# Patient Record
Sex: MALE | Race: ASIAN | Employment: UNEMPLOYED | URBAN - METROPOLITAN AREA
[De-identification: names, ages, dates, MRNs, and addresses within clinical notes are randomized per-mention and may not be internally consistent; named-entity substitution may affect disease eponyms.]

---

## 2019-05-27 ENCOUNTER — APPOINTMENT (OUTPATIENT)
Dept: CT IMAGING | Age: 39
End: 2019-05-27
Attending: EMERGENCY MEDICINE
Payer: SELF-PAY

## 2019-05-27 ENCOUNTER — HOSPITAL ENCOUNTER (EMERGENCY)
Age: 39
Discharge: HOME | End: 2019-05-27
Attending: EMERGENCY MEDICINE
Payer: SELF-PAY

## 2019-05-27 VITALS
SYSTOLIC BLOOD PRESSURE: 117 MMHG | RESPIRATION RATE: 14 BRPM | WEIGHT: 136 LBS | OXYGEN SATURATION: 100 % | HEART RATE: 60 BPM | TEMPERATURE: 98.9 F | BODY MASS INDEX: 23.22 KG/M2 | DIASTOLIC BLOOD PRESSURE: 78 MMHG | HEIGHT: 64 IN

## 2019-05-27 DIAGNOSIS — R51.9 NONINTRACTABLE HEADACHE, UNSPECIFIED CHRONICITY PATTERN, UNSPECIFIED HEADACHE TYPE: Primary | ICD-10-CM

## 2019-05-27 LAB
ALBUMIN SERPL-MCNC: 3.9 G/DL (ref 3.4–5)
ALBUMIN/GLOB SERPL: 1.1 {RATIO} (ref 0.8–1.7)
ALP SERPL-CCNC: 67 U/L (ref 45–117)
ALT SERPL-CCNC: 28 U/L (ref 16–61)
ANION GAP SERPL CALC-SCNC: 7 MMOL/L (ref 3–18)
AST SERPL-CCNC: 22 U/L (ref 15–37)
BASOPHILS # BLD: 0 K/UL (ref 0–0.1)
BASOPHILS NFR BLD: 0 % (ref 0–2)
BILIRUB SERPL-MCNC: 0.3 MG/DL (ref 0.2–1)
BUN SERPL-MCNC: 11 MG/DL (ref 7–18)
BUN/CREAT SERPL: 12 (ref 12–20)
CALCIUM SERPL-MCNC: 9.1 MG/DL (ref 8.5–10.1)
CHLORIDE SERPL-SCNC: 108 MMOL/L (ref 100–108)
CO2 SERPL-SCNC: 28 MMOL/L (ref 21–32)
CREAT SERPL-MCNC: 0.95 MG/DL (ref 0.6–1.3)
DIFFERENTIAL METHOD BLD: ABNORMAL
EOSINOPHIL # BLD: 0.1 K/UL (ref 0–0.4)
EOSINOPHIL NFR BLD: 1 % (ref 0–5)
ERYTHROCYTE [DISTWIDTH] IN BLOOD BY AUTOMATED COUNT: 13.8 % (ref 11.6–14.5)
ERYTHROCYTE [SEDIMENTATION RATE] IN BLOOD: 5 MM/HR (ref 0–15)
GLOBULIN SER CALC-MCNC: 3.6 G/DL (ref 2–4)
GLUCOSE SERPL-MCNC: 100 MG/DL (ref 74–99)
HCT VFR BLD AUTO: 40.4 % (ref 36–48)
HGB BLD-MCNC: 13.6 G/DL (ref 13–16)
LYMPHOCYTES # BLD: 2.6 K/UL (ref 0.9–3.6)
LYMPHOCYTES NFR BLD: 35 % (ref 21–52)
MCH RBC QN AUTO: 29.5 PG (ref 24–34)
MCHC RBC AUTO-ENTMCNC: 33.7 G/DL (ref 31–37)
MCV RBC AUTO: 87.6 FL (ref 74–97)
MONOCYTES # BLD: 0.7 K/UL (ref 0.05–1.2)
MONOCYTES NFR BLD: 9 % (ref 3–10)
NEUTS SEG # BLD: 4 K/UL (ref 1.8–8)
NEUTS SEG NFR BLD: 55 % (ref 40–73)
PLATELET # BLD AUTO: 227 K/UL (ref 135–420)
PMV BLD AUTO: 9.3 FL (ref 9.2–11.8)
POTASSIUM SERPL-SCNC: 3.8 MMOL/L (ref 3.5–5.5)
PROT SERPL-MCNC: 7.5 G/DL (ref 6.4–8.2)
RBC # BLD AUTO: 4.61 M/UL (ref 4.7–5.5)
SODIUM SERPL-SCNC: 143 MMOL/L (ref 136–145)
WBC # BLD AUTO: 7.4 K/UL (ref 4.6–13.2)

## 2019-05-27 PROCEDURE — 96361 HYDRATE IV INFUSION ADD-ON: CPT

## 2019-05-27 PROCEDURE — 74011250636 HC RX REV CODE- 250/636: Performed by: EMERGENCY MEDICINE

## 2019-05-27 PROCEDURE — 74011250636 HC RX REV CODE- 250/636: Performed by: PHYSICIAN ASSISTANT

## 2019-05-27 PROCEDURE — 96375 TX/PRO/DX INJ NEW DRUG ADDON: CPT

## 2019-05-27 PROCEDURE — 85025 COMPLETE CBC W/AUTO DIFF WBC: CPT

## 2019-05-27 PROCEDURE — 70450 CT HEAD/BRAIN W/O DYE: CPT

## 2019-05-27 PROCEDURE — 85652 RBC SED RATE AUTOMATED: CPT

## 2019-05-27 PROCEDURE — 96374 THER/PROPH/DIAG INJ IV PUSH: CPT

## 2019-05-27 PROCEDURE — 99283 EMERGENCY DEPT VISIT LOW MDM: CPT

## 2019-05-27 PROCEDURE — 80053 COMPREHEN METABOLIC PANEL: CPT

## 2019-05-27 RX ORDER — DIPHENHYDRAMINE HYDROCHLORIDE 50 MG/ML
25 INJECTION, SOLUTION INTRAMUSCULAR; INTRAVENOUS ONCE
Status: COMPLETED | OUTPATIENT
Start: 2019-05-27 | End: 2019-05-27

## 2019-05-27 RX ORDER — NAPROXEN 500 MG/1
500 TABLET ORAL 2 TIMES DAILY WITH MEALS
Qty: 20 TAB | Refills: 0 | Status: SHIPPED | OUTPATIENT
Start: 2019-05-27

## 2019-05-27 RX ORDER — PROCHLORPERAZINE EDISYLATE 5 MG/ML
10 INJECTION INTRAMUSCULAR; INTRAVENOUS
Status: DISCONTINUED | OUTPATIENT
Start: 2019-05-27 | End: 2019-05-27 | Stop reason: HOSPADM

## 2019-05-27 RX ORDER — KETOROLAC TROMETHAMINE 30 MG/ML
30 INJECTION, SOLUTION INTRAMUSCULAR; INTRAVENOUS
Status: COMPLETED | OUTPATIENT
Start: 2019-05-27 | End: 2019-05-27

## 2019-05-27 RX ADMIN — DIPHENHYDRAMINE HYDROCHLORIDE 25 MG: 50 INJECTION INTRAMUSCULAR; INTRAVENOUS at 18:47

## 2019-05-27 RX ADMIN — KETOROLAC TROMETHAMINE 30 MG: 30 INJECTION, SOLUTION INTRAMUSCULAR; INTRAVENOUS at 18:48

## 2019-05-27 RX ADMIN — SODIUM CHLORIDE 1000 ML: 900 INJECTION, SOLUTION INTRAVENOUS at 18:47

## 2019-05-27 NOTE — ED TRIAGE NOTES
Left sided headache intermittently x 2 weeks, over last 2 days pain has been more constant and worsening

## 2019-05-27 NOTE — DISCHARGE INSTRUCTIONS
Patient Education        Dolor de Aleja Enter: Instrucciones de cuidado - [ Headache: Care Instructions ]  Instrucciones de cuidado    Los chris de lamberto tienen muchas causas posibles. La mayoría de los chris de lamberto no son señal de un problema más kishan y mejoran por sí solos. El tratamiento en el hogar podría ayudarlo a sentirse mejor con Vimal Miranda. El médico lo ha revisado minuciosamente, jose puede desarrollar problemas más tarde. Si nota algún problema o síntomas, busque tratamiento médico inmediatamente. La atención de seguimiento es florence parte clave de shukla tratamiento y seguridad. Asegúrese de hacer y acudir a todas las citas, y llame a shukla médico si está teniendo problemas. También es florence buena idea saber los resultados de jaime exámenes y mantener florence lista de los medicamentos que uche. ¿Cómo puede cuidarse en el [facility]? · No conduzca si ha tomado analgésicos (medicamentos para el dolor) recetados. · Descanse en un cuarto tranquilo y oscuro hasta que desaparezca el dolor de Aleja Enter. Cierre los ojos y trate de relajarse o dormirse. No vandana la televisión ni sachi. · Colóquese un paño frío y húmedo o [facility] compresa fría sobre la iveth adolorida de 10 a 21 minutos cada vez. Póngase un paño resendez entre la compresa fría y la piel. · Utilice florence toalla húmeda tibia o florence almohadilla térmica ajustada a baja temperatura para relajar los músculos tensos del frederic y los hombros.  · [place]ídale a alguien que le antonette masajes suaves en el frederic y los hombros.  · [place] los analgésicos exactamente lauren le fueron indicados. ? Si el médico le recetó un analgésico, tómelo según las indicaciones. ? Si no está tomando un analgésico recetado, pregúntele a shukla médico si puede keyla claude de [address] · Tenga cuidado de no keyla analgésicos con mayor frecuencia que la permitida en las indicaciones porque los chris de lamberto podrían empeorar o aparecer con mayor frecuencia florence vez que el medicamento pierda shukla [place].   · Preste atención a los [employer] que aparecen con el dolor de [place], [place], debilidad o entumecimiento, cambios en la visión o confusión. Podrían ser señales de un problema más grave. Para prevenir los chris de lamberto  · [employer] un diario de jaime chris de lamberto para que pueda averiguar qué los desencadena. Evitar los desencadenantes podría ayudar a prevenir los chris de [place]. Anote cuándo empieza cada dolor de [place], cuánto dura y cómo es el dolor (palpitante, jacquie, punzante o sordo). Anote cualquier otro síntoma que haya tenido con el dolor de [place], [place] náuseas, destellos de renetta o EMERSON, o sensibilidad a la renetta brillante o a los ruidos erin. Anote si el dolor de lamberto ocurrió cerca de shukla menstruación. Enumere todos los factores que pudieran beata desencadenado el dolor de [place], lauren ciertos alimentos (chocolate, queso, vino) u olores, humo, luces brillantes, estrés o falta de sueño. · Encuentre maneras saludables de [facility]. Los chris de [place] son más comunes netta o kate después de un momento estresante. Tómese un tiempo para relajarse antes y después de hacer algo que le haya causado un dolor de lamberto en el pasado. · Trate de mantener jaime músculos relajados mediante florence buena postura. Revise si tiene [employer] de la [place], la cortney, el frederic y los hombros y trate de relajarlos. Cuando se siente en un escritorio, cambie de posición con frecuencia y estírese por 27 segundos cada hora. · Noemi suficiente ejercicio y duerma bastante. · Coma en forma regular y jaz. Largos períodos sin comer pueden provocar un dolor de lamberto. · Regálese un masaje. Algunas personas encuentran que los masajes hechos con regularidad son [place] para aliviar la tensión. · Limite la cafeína. No mikki demasiado café, té ni sodas. Nicho no deje de consumir cafeína de repente, porque eso también puede provocarle chris de [place].   · Reduzca la tensión en los ojos a causa de la computadora parpadeando con frecuencia y apartando la mirada de la pantalla a menudo. Asegúrese de tener lentes adecuados y de que shukla monitor esté colocado de manera correcta, lauren a un brazo de distancia. · Busque ayuda si tiene depresión o ansiedad. Olamide chris de [place] podrían relacionarse con estas afecciones. El tratamiento puede prevenir los chris de [place] y ayudar con los síntomas de ansiedad o depresión. ¿Cuándo debe pedir ayuda? Llame al 911 en cualquier momento que piense que puede necesitar atención de emergencia. Por ejemplo, llame si:    · Tiene señales de un ataque cerebral. Estas pueden incluir:  ? Parálisis, entumecimiento o debilidad repentinos en la cortney, el brazo o la pierna, sobre todo si ocurre en un solo lado del cuerpo. ? Cambios repentinos en la visión. ? Dificultades repentinas para hablar. ? Confusión repentina o dificultad para comprender frases sencillas. ? Problemas repentinos para caminar o mantener el equilibrio. ? Dolor de [place] intenso y repentino, distinto de los chris de lamberto anteriores.    Llame a shukla médico ahora mismo o busque atención médica inmediata si:    · Tiene un dolor de [place] o peor.     · Shukla dolor de lamberto empeora mucho. ¿Dónde puede encontrar más información en inglés? William Safer a http://angela-glen.info/. Escriba K765 en la búsqueda para aprender más acerca de \"Dolor de lamberto: Instrucciones de cuidado - [ Headache: Care Instructions ]. \"  Revisado: 3 junio, 2018  Versión del contenido: 11.9  © 2306-1033 [employer]. Las instrucciones de cuidado fueron adaptadas bajo licencia por [employer] (which disclaims liability or warranty for this information). Si usted tiene [place] afección médica o sobre estas instrucciones, siempre pregunte a shukla profesional de brooks. [employer] niega toda garantía o responsabilidad por shukla uso de esta información.

## 2019-05-28 NOTE — ED NOTES
I have reviewed discharge instructions with the patient and spouse. The patient and spouse verbalized understanding. Patient armband removed and shredded  IV catheter discontinued intact. Site without signs and symptoms of complications. Dressing and pressure applied.

## 2019-06-07 NOTE — ED PROVIDER NOTES
EMERGENCY DEPARTMENT HISTORY AND PHYSICAL EXAM    7:08 AM      Date: 5/27/2019  Patient Name: Karmen Herman    History of Presenting Illness     Chief Complaint   Patient presents with    Headache         History Provided By: Patient    Additional History (Context): Karmen Herman is a 41 y.o. male with no past medical history who presents with left-sided headache for 2 weeks. Patient is a Croatian speaker. Patient denies any trauma to the head. Patient denies any fever. Does have a history of anxiety. He denies smoking alcohol or recreational drug use. This is not his worst headache. Sadiq Kwok PCP: None    Current Outpatient Medications   Medication Sig Dispense Refill    naproxen (NAPROSYN) 500 mg tablet Take 1 Tab by mouth two (2) times daily (with meals). 20 Tab 0       Past History     Past Medical History:  History reviewed. No pertinent past medical history. Past Surgical History:  History reviewed. No pertinent surgical history. Family History:  History reviewed. No pertinent family history. Social History:  Social History     Tobacco Use    Smoking status: Never Smoker    Smokeless tobacco: Never Used   Substance Use Topics    Alcohol use: Never     Frequency: Never    Drug use: Not on file       Allergies:  No Known Allergies      Review of Systems       Review of Systems   Constitutional: Negative. Negative for chills, diaphoresis and fever. HENT: Negative. Negative for congestion, rhinorrhea and sore throat. Eyes: Negative. Negative for pain, discharge and redness. Respiratory: Negative. Negative for cough, chest tightness, shortness of breath and wheezing. Cardiovascular: Negative. Negative for chest pain. Gastrointestinal: Negative. Negative for abdominal pain, constipation, diarrhea, nausea and vomiting. Genitourinary: Negative. Negative for dysuria, flank pain, frequency, hematuria and urgency. Musculoskeletal: Negative.   Negative for back pain and neck pain. Skin: Negative. Negative for rash. Neurological: Positive for headaches. Negative for syncope, weakness and numbness. Psychiatric/Behavioral: Negative. All other systems reviewed and are negative. Physical Exam     Visit Vitals  /78   Pulse 60   Temp 98.9 °F (37.2 °C)   Resp 14   Ht 5' 4\" (1.626 m)   Wt 61.7 kg (136 lb)   SpO2 100%   BMI 23.34 kg/m²         Physical Exam   Constitutional: He appears well-developed and well-nourished. Non-toxic appearance. He does not have a sickly appearance. He does not appear ill. No distress. HENT:   Head: Normocephalic and atraumatic. Mouth/Throat: Oropharynx is clear and moist. No oropharyngeal exudate. Eyes: Pupils are equal, round, and reactive to light. Conjunctivae and EOM are normal. No scleral icterus. Neck: Normal range of motion. Neck supple. No hepatojugular reflux and no JVD present. No tracheal deviation present. No thyromegaly present. Cardiovascular: Normal rate, regular rhythm, S1 normal, S2 normal, normal heart sounds, intact distal pulses and normal pulses. Exam reveals no gallop, no S3 and no S4. No murmur heard. Pulses:       Radial pulses are 2+ on the right side, and 2+ on the left side. Dorsalis pedis pulses are 2+ on the right side, and 2+ on the left side. Pulmonary/Chest: Effort normal and breath sounds normal. No respiratory distress. He has no decreased breath sounds. He has no wheezes. He has no rhonchi. He has no rales. Abdominal: Soft. Normal appearance and bowel sounds are normal. He exhibits no distension and no mass. There is no hepatosplenomegaly. There is no tenderness. There is no rigidity, no rebound, no guarding, no CVA tenderness, no tenderness at McBurney's point and negative Doshi's sign. Musculoskeletal: Normal range of motion. Lymphadenopathy:        Head (right side): No submental, no submandibular, no preauricular and no occipital adenopathy present.         Head (left side): No submental, no submandibular, no preauricular and no occipital adenopathy present. He has no cervical adenopathy. Right: No supraclavicular adenopathy present. Left: No supraclavicular adenopathy present. Neurological: He is alert. He has normal strength and normal reflexes. He is not disoriented. No cranial nerve deficit or sensory deficit. Coordination and gait normal. GCS eye subscore is 4. GCS verbal subscore is 5. GCS motor subscore is 6. Skin: Skin is warm, dry and intact. No rash noted. He is not diaphoretic. Psychiatric: He has a normal mood and affect. His speech is normal and behavior is normal. Judgment and thought content normal. Cognition and memory are normal.   Nursing note and vitals reviewed. Diagnostic Study Results     Labs -  No results found for this or any previous visit (from the past 12 hour(s)). Radiologic Studies -   CT HEAD WO CONT   Final Result      Normal CT of the head. Medical Decision Making   Provider Notes (Medical Decision Making):  MDM  Number of Diagnoses or Management Options  Nonintractable headache, unspecified chronicity pattern, unspecified headache type:   Diagnosis management comments: headaches      I am the first provider for this patient. I reviewed the vital signs, available nursing notes, past medical history, past surgical history, family history and social history. Vital Signs-Reviewed the patient's vital signs. Records Reviewed: Nursing Notes (Time of Review: 7:08 AM)    ED Course: Progress Notes, Reevaluation, and Consults:    Labs essentially normal.  CT head pending. Turned over to Dr. Cody Banks for follow-up on CAT scan of the head and final disposition. Diagnosis         Clinical Impression:   1.  Nonintractable headache, unspecified chronicity pattern, unspecified headache type        Disposition: Pending     Follow-up Information     Follow up With Specialties Details Why Contact Info [address] [facility]  In 2 days  [facility] [id number]  [address] [place] [id number]  [phone]             Attestation        Provider Attestation:     I personally performed the services described in the documentation, reviewed the documentation and it accurately and completely records my words and actions utilizing the [address] June 07, 2019 at 7:08 AM - [place], [address]. It is dictated using utilizing voice recognition software. Unfortunately this leads to occasional typographical errors. I apologize in advance if the situation occurs. If questions arise please do not hesitate to contact me or call our department.